# Patient Record
Sex: MALE | Race: WHITE
[De-identification: names, ages, dates, MRNs, and addresses within clinical notes are randomized per-mention and may not be internally consistent; named-entity substitution may affect disease eponyms.]

---

## 2020-03-13 ENCOUNTER — HOSPITAL ENCOUNTER (OUTPATIENT)
Dept: HOSPITAL 25 - OR | Age: 40
Discharge: HOME | End: 2020-03-13
Attending: OTOLARYNGOLOGY
Payer: COMMERCIAL

## 2020-03-13 VITALS — DIASTOLIC BLOOD PRESSURE: 107 MMHG | SYSTOLIC BLOOD PRESSURE: 159 MMHG

## 2020-03-13 DIAGNOSIS — Z88.1: ICD-10-CM

## 2020-03-13 DIAGNOSIS — Z79.51: ICD-10-CM

## 2020-03-13 DIAGNOSIS — Z88.0: ICD-10-CM

## 2020-03-13 DIAGNOSIS — J45.909: ICD-10-CM

## 2020-03-13 DIAGNOSIS — R13.19: ICD-10-CM

## 2020-03-13 DIAGNOSIS — J31.0: ICD-10-CM

## 2020-03-13 DIAGNOSIS — Z88.8: ICD-10-CM

## 2020-03-13 DIAGNOSIS — J35.1: Primary | ICD-10-CM

## 2020-03-13 PROCEDURE — 88304 TISSUE EXAM BY PATHOLOGIST: CPT

## 2020-03-13 NOTE — OP
DATE OF OPERATION:  03/13/20 - SDS

 

DATE OF BIRTH:  [birth year]

 

SURGEON:  Eddie Marques MD

 

PRE-OP DIAGNOSIS:  Hypertrophied tonsils with odynophagia.

 

POST-OP DIAGNOSIS:  Hypertrophied tonsils with odynophagia.

 

OPERATIVE PROCEDURE:  Tonsillectomy.

 

INDICATIONS:  This 39-year-old gentleman with markedly enlarged tonsil with 
significant odynophagia, chronic, elected for surgical therapy for concerns of 
neoplasm.

 

DESCRIPTION OF PROCEDURE:  The patient was taken to the operating room.  
General anesthetic was given.  The patient was intubated.  Tongue, mandible, 
and soft palate were retracted.  Coblation dissection within the tonsillar 
plane was carried out on both sides.  Once hemostasis was obtained, the patient 
awakened, extubated, and sent to the recovery room in stable condition.  The 
specimens were sent for permanent section.

 

 [id number] #: [id number]

MTDD